# Patient Record
Sex: MALE | Race: BLACK OR AFRICAN AMERICAN | NOT HISPANIC OR LATINO | URBAN - METROPOLITAN AREA
[De-identification: names, ages, dates, MRNs, and addresses within clinical notes are randomized per-mention and may not be internally consistent; named-entity substitution may affect disease eponyms.]

---

## 2017-02-26 ENCOUNTER — TRANSFERRED RECORDS (OUTPATIENT)
Dept: HEALTH INFORMATION MANAGEMENT | Facility: CLINIC | Age: 42
End: 2017-02-26

## 2017-03-30 ENCOUNTER — BEH TREATMENT PLAN (OUTPATIENT)
Dept: BEHAVIORAL HEALTH | Facility: CLINIC | Age: 42
End: 2017-03-30

## 2017-03-30 ENCOUNTER — HOSPITAL ENCOUNTER (OUTPATIENT)
Dept: BEHAVIORAL HEALTH | Facility: CLINIC | Age: 42
Discharge: HOME | End: 2017-03-30
Attending: SOCIAL WORKER | Admitting: SOCIAL WORKER
Payer: COMMERCIAL

## 2017-03-30 VITALS
HEIGHT: 73 IN | SYSTOLIC BLOOD PRESSURE: 119 MMHG | WEIGHT: 220 LBS | HEART RATE: 106 BPM | DIASTOLIC BLOOD PRESSURE: 88 MMHG | BODY MASS INDEX: 29.16 KG/M2

## 2017-03-30 PROCEDURE — H0001 ALCOHOL AND/OR DRUG ASSESS: HCPCS

## 2017-03-30 ASSESSMENT — ANXIETY QUESTIONNAIRES
4. TROUBLE RELAXING: NOT AT ALL
GAD7 TOTAL SCORE: 2
3. WORRYING TOO MUCH ABOUT DIFFERENT THINGS: NOT AT ALL
6. BECOMING EASILY ANNOYED OR IRRITABLE: SEVERAL DAYS
1. FEELING NERVOUS, ANXIOUS, OR ON EDGE: SEVERAL DAYS
5. BEING SO RESTLESS THAT IT IS HARD TO SIT STILL: NOT AT ALL
2. NOT BEING ABLE TO STOP OR CONTROL WORRYING: NOT AT ALL
7. FEELING AFRAID AS IF SOMETHING AWFUL MIGHT HAPPEN: NOT AT ALL

## 2017-03-30 ASSESSMENT — PAIN SCALES - GENERAL: PAINLEVEL: NO PAIN (0)
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
## 2017-03-31 ASSESSMENT — PATIENT HEALTH QUESTIONNAIRE - PHQ9: SUM OF ALL RESPONSES TO PHQ QUESTIONS 1-9: 0

## 2017-03-31 ASSESSMENT — ANXIETY QUESTIONNAIRES: GAD7 TOTAL SCORE: 2
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
## 2017-04-03 ENCOUNTER — HOSPITAL ENCOUNTER (OUTPATIENT)
Dept: BEHAVIORAL HEALTH | Facility: CLINIC | Age: 42
End: 2017-04-03
Attending: SOCIAL WORKER
Payer: COMMERCIAL

## 2017-04-03 PROBLEM — F19.20 CHEMICAL DEPENDENCY (H): Status: ACTIVE | Noted: 2017-04-03

## 2017-04-03 PROCEDURE — H2035 A/D TX PROGRAM, PER HOUR: HCPCS | Mod: HQ
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
## 2017-04-04 NOTE — PROGRESS NOTES
Initial Services Plan        Before your first treatment group, please do the following    Immediate health & safety concerns:  Go to the emergency room if you start to have withdrawal symptoms.  Look for sober housing and a supportive social network.  Obtain personal items (glasses, hearing aides, medicines, diabetes supplies, clothing, etc.).  Look for a support network (such as AA, NA, DBT group, a Spiritism group, etc.)    Suggestions for client during the time between intake & completion of treatment plan:  Tour your treatment center (unit or outpatient clinic).  Introduce yourself to the treatment group.  Spend time getting to know your peers.  Complete your psycho-social paperwork.  Complete the problem list for your treatment plan.  Review your patient or client handbook.    Client issues to be addressed in the first treatment sessions:  Talk to your family and friends about the family program.  Arrange  and transportation as needed.  Contact your P.O. (/) if needed.   Explore treatment rationale at first group session.      CAROLE Carpenter  4/3/2017  8:10 PM

## 2017-04-04 NOTE — PROGRESS NOTES
Orientation to [facility] Program  Date: 4/3/2017  Time: 7:30pm Duration: 1 hour  D- Client attended orientation. He was provided with the orientation packet which included: Program philosophy, treatment goals, program schedules, education components, using treatment materials, program rules, client rights/responsibilities, information on HIV, STDS, Hepatitis, TB, information on use while pregnant, Nalaxone (Narcan) and opioid education information, program abuse prevention plan/reporting protocol, client grievance procedure, risks of treatment, confidentiality, treatment agreement, facility tour, safety information and information on co-occurring disorders. Client was also given the business card of his counselor and was told to contact him if he has any questions. Client was introduced to the Stages of Change Model and Post-Acute Withdrawal.   I- Signing of paperwork, tour of facility and treatment planning session. Goals developed this session included finding a sober support network. Client was provided with psychoeducation about Stages of Change, Post-Acute Withdrawal and was asked about where he feels he is in the stages of change. Client was informed about what each stage of change means.  A- Client was engaged and alert. He appears to be in the preparation stage.   P-  Client will review orientation paperwork and materials.   CAROLE Rivera

## 2017-04-10 ENCOUNTER — RECORDS - HEALTHEAST (OUTPATIENT)
Dept: LAB | Facility: CLINIC | Age: 42
End: 2017-04-10

## 2017-04-10 LAB
CHOLEST SERPL-MCNC: 159 MG/DL
FASTING STATUS PATIENT QL REPORTED: ABNORMAL
HDLC SERPL-MCNC: 33 MG/DL
LDLC SERPL CALC-MCNC: 80 MG/DL
TRIGL SERPL-MCNC: 228 MG/DL

## 2017-04-11 ENCOUNTER — HOSPITAL ENCOUNTER (OUTPATIENT)
Dept: BEHAVIORAL HEALTH | Facility: CLINIC | Age: 42
End: 2017-04-11
Attending: SOCIAL WORKER
Payer: COMMERCIAL

## 2017-04-11 PROCEDURE — H2035 A/D TX PROGRAM, PER HOUR: HCPCS | Mod: HQ

## 2017-04-12 NOTE — PROGRESS NOTES
[facility]  Adult Chemical Dependency Program  Treatment Plan Requirements      Adult CD Treatment Plan                                Maurice Sandifer   [id number]   1975 42 year old male      These services are provided by the facility for each patient/client according to the individual's treatment plan:    Individual and group counseling    Education    Transition services    Services to address any co-occurring mental illness    Service coordination    Initial Treatment Plan Goals:  1. Complete all the requirements of Program Orientation.  2. Maintain medication compliance throughout the program.  3. Complete requirements for workshop/skills groups based on identified issues on your problem list.  4. Complete the support group attendance feedback sheet weekly.  5. Gain family involvement in treatment process to address family issues from the problem list.  6. Attend and participate in all required groups per individual treatment plan.  7. Focus attention to individualized issues from the treatment plan.  8. Complete all requirements for UA's, alcohol screening tests and other testing.  9. Schedule a physical examination if recommended.    In addition to the above, complete all individual goals as specifically outlines on your treatment plan.    Criteria for discharge:  Patients/clients are discharged from the program following completion of the entire program including Phase I and II or acceptance of other post-treatment referrals such as [facility] house, or aftercare at other facilities.  Patients/clients may also be discharged for inappropriate behavior or chemical use.      Favorable Discharge - Patients/clients have completed agreed upon treatment goals, understand their diagnosis and appear motivated about the follow-up care.    Guarded Discharge - Patients/clients have demonstrated some understanding of their diagnosis and recovery process, and have completed some of their  treatment goals.  This prognosis also includes patients/clients who have completed some treatment goals but have not made commitment to community support or follow through with referrals.    Unfavorable Discharge - Patients/clients have not completed agreed upon treatment goals due to their own choice, have limited understanding of their diagnosis, and have shown minimal or inconsistent behavior conducive to recovery.  Those patients/clients discharged due to behavioral problems will also be unfavorable discharges.      Acute Intoxication/Withdrawal Potential     DIMENSION 1  RISK FACTOR: 0     SUBSTANCE USE DISORDERS:  Alcohol, Cannabis and Cocaine            Date Assigned Source Area of Treatment Focus / Goal / Treatment Strategies    Target  Date Initials Outcome Date Completed   4/11/17 Self -  Current  Area of Treatment Focus:   Substance use, cravings and urges.  Last use date was reported as 2/25/17.       Goal:   Develop effective strategies to maintain sobriety.      Treatment Strategies:   Report to counselor and group any alcohol or drug use.   To end of tx wsm Client did not return to treatment 4/19/17       Biomedical Conditions and Complaints     DIMENSION 2  RISK FACTOR: 0             Date Assigned Source Area of Treatment Focus / Goal / Treatment Strategies Target  Date Initials Outcome Date Completed   4/11/17 Self -  Current  Area of Treatment Focus:  Client reported no physical biomedical conditions of note.    Goal:   None    Treatment Strategies:   None   na wsm Client did not return to treatment 4/19/17       Emotional/Behavioral/Cognitive Conditions and Complications     DIMENSION 3  RISK FACTOR: 2             Date Assigned Source Area of Treatment Focus / Goal / Treatment Strategies Target  Date Initials Outcome Date Completed     4/11/17 Self -  Current  Area of Treatment Focus:   Client did not return to treatment to develop Tx plan    Goal:   n/a    Treatment Strategies:    n/a   n/a          Readiness to Change     DIMENSION 4  RISK FACTOR: 1             Date Assigned Source Area of Treatment Focus / Goal / Treatment Strategies Target  Date Initials Outcome Date Completed      Area of Treatment Focus:  {Client did not return to treatment to develop Tx plan    Goal:   n/a    Treatment Strategies:   n/a   n/a         Relapse/Continues Use/Continues Problem Potential     DIMENSION 5  RISK FACTOR: 3                 Date Assigned Source Area of Treatment Focus / Goal / Treatment Strategies Target  Date Initials Outcome Date Completed      Area of Treatment Focus:   Client did not return to treatment to develop Tx plan    Goal:   n/a    Treatment Strategies:   n/a   n/a        Recovery Environment     DIMENSION 6  RISK FACTOR: 2                 Date Assigned Source Area of Treatment Focus / Goal / Treatment Strategies Target  Date Initials Outcome Date Completed      Area of Treatment Focus:   Client did not return to treatment to develop Tx plan    Goal:   n/a    Treatment Strategies:   n/a   n/a      Individual abuse prevention plan (required for lodging plus) : specific actions, referral:   No additional protection measures required other than the Program Abuse Prevention Plan - No
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
## 2017-04-12 NOTE — PROGRESS NOTES
Acknowledgement of Current Treatment Plan       I have reviewed my treatment plan with my counselor. I agree with the plan as it is written in the electronic health record.         Last reported use: 2/25/17    Name Signature Date   Maurice Sandifer     Name of Therapist / Counselor     Ramsey Pearson, CAROLE, LMFT

## 2017-04-19 NOTE — PROGRESS NOTES
"CHEMICAL DEPENDENCY DISCHARGE SUMMARY      EVALUATION COUNSELOR:  CAROLE Muller.   TREATMENT COUNSELOR:  CAROLE Romo, [facility].   REFERRAL SOURCE:  Self.   PROGRAM:  [facility] Adult Intensive Outpatient CD Program at [facility].   ADMISSION DATE:  04/03/2017.   LAST SESSION DATE:  04/03/2017.   DISCHARGE DATE:  04/19/2017.  Maurice Sandifer did not attend group session after attending orientation.  Client was contacted by counselor and left a voicemail indicating that if he did not respond he would be discharged.  Client did not respond.   ADMISSION DIAGNOSES:   1.  Alcohol use disorder, severe, F10.20.   2.  Cannabis use disorder, severe, F12.20.   3.  Cocaine use disorder, disorder, severe, F14.20.   4.  Opioid use disorder, moderate, F11.20.   DISCHARGE DIAGNOSES:     1.  Alcohol use disorder, severe, F10.20.   2.  Cannabis use disorder, severe, F12.20.   3.  Cocaine use disorder, disorder, severe, F14.20.   4.  Opioid use disorder, moderate, F11.20.   DISCHARGE STATUS:  Against staff approval.   LAST USE DATE:  Last known use date reported by the client was 02/25/2017.   HOURS OF TREATMENT COMPLETED:  The client completed 1 hour of group session.      PRESENTING INFORMATION:  Client presented for a CD evaluation as he reported he wanted ongoing care because \"I don't want to die.\"  He was at [facility] in [place] from 02/26/2017 until 03/27/2017.      SERVICES PROVIDED:  Chemical dependency assessment, orientation to the program.      ISSUES ADDRESSED IN TREATMENT:   DIMENSION 1:  Acute Intoxication Withdrawal Potential:  Admission risk rating 0.  Discharge risk cannot be assessed.  Client only attended 1 hour of treatment.      DIMENSION 2:  Initial risk rating 0, current risk rating cannot be assessed.      DIMENSION 3:  Initial risk rating 2, current risk rating cannot be assessed.      DIMENSION 4:  Admission risk rating 1.  Discharge risk rating cannot be assessed.    "   DIMENSION 5:  Admission risk rating 3.  Discharge risk rating cannot be assessed.      DIMENSION 6:  Admission risk rating 2.  Discharge risk rating cannot be assessed.  Client only attended 1 hour of treatment.  Therefore, treatment plan was not completed and no progress was made.      STRENGTHS:  Cannot be assessed at this time as client only attended 1 hour of treatment and was not assessed by this writer.      PROGNOSIS:  Unfavorable.      LAST KNOWN LIVING ARRANGEMENTS:  Client was living with his wife and her 4 children.      CONTINUING CARE RECOMMENDATIONS:  Client is recommended to follow through with the original recommendations of his CD assessment which were to complete co-occurring intensive outpatient treatment, abstinence, attending support group meetings and begin mental health therapy.         This information has been disclosed to you from records protected by Federal confidentiality rules (42 CFR part 2). The Federal rules prohibit you from making any further disclosure of this information unless further disclosure is expressly permitted by the written consent of the person to whom it pertains or as otherwise permitted by 42 CFR part 2. A general authorization for the release of medical or other information is NOT sufficient for this purpose. The Federal rules restrict any use of the information to criminally investigate or prosecute any alcohol or drug abuse patient.      JOCELYN KEHR SPARBY, [facility]             D: 2017 08:56   T: 2017 10:37   MT: SK      Name:     SANDIFER, MAURICE   MRN:      [id number]        Account:      [id number]   :      1975           Visit Date:   2017      Document: [id number]

## 2017-06-29 ENCOUNTER — OFFICE VISIT - HEALTHEAST (OUTPATIENT)
Dept: SLEEP MEDICINE | Facility: CLINIC | Age: 42
End: 2017-06-29

## 2017-06-29 DIAGNOSIS — G47.10 HYPERSOMNIA, UNSPECIFIED: ICD-10-CM

## 2017-06-29 DIAGNOSIS — R29.818 SUSPECTED SLEEP APNEA: ICD-10-CM

## 2017-06-29 DIAGNOSIS — G47.8 SLEEP DYSFUNCTION WITH SLEEP STAGE DISTURBANCE: ICD-10-CM

## 2017-06-29 DIAGNOSIS — R06.83 SNORING: ICD-10-CM

## 2017-06-29 ASSESSMENT — MIFFLIN-ST. JEOR: SCORE: 1994.84

## 2017-07-26 ENCOUNTER — RECORDS - HEALTHEAST (OUTPATIENT)
Dept: SLEEP MEDICINE | Facility: CLINIC | Age: 42
End: 2017-07-26

## 2017-07-26 DIAGNOSIS — G47.30 SLEEP APNEA, UNSPECIFIED: ICD-10-CM

## 2017-07-26 DIAGNOSIS — R06.83 SNORING: ICD-10-CM

## 2017-07-26 DIAGNOSIS — G47.10 HYPERSOMNIA, UNSPECIFIED: ICD-10-CM

## 2017-07-26 DIAGNOSIS — G47.8 OTHER SLEEP DISORDERS: ICD-10-CM

## 2017-08-07 ENCOUNTER — COMMUNICATION - HEALTHEAST (OUTPATIENT)
Dept: SLEEP MEDICINE | Facility: CLINIC | Age: 42
End: 2017-08-07

## 2017-08-17 ENCOUNTER — OFFICE VISIT - HEALTHEAST (OUTPATIENT)
Dept: SLEEP MEDICINE | Facility: CLINIC | Age: 42
End: 2017-08-17

## 2017-08-17 ENCOUNTER — AMBULATORY - HEALTHEAST (OUTPATIENT)
Dept: SLEEP MEDICINE | Facility: CLINIC | Age: 42
End: 2017-08-17

## 2017-08-17 DIAGNOSIS — G47.8 SLEEP DYSFUNCTION WITH SLEEP STAGE DISTURBANCE: ICD-10-CM

## 2017-08-17 DIAGNOSIS — G47.10 HYPERSOMNIA, UNSPECIFIED: ICD-10-CM

## 2017-08-17 DIAGNOSIS — G47.33 OSA (OBSTRUCTIVE SLEEP APNEA): ICD-10-CM

## 2017-08-17 ASSESSMENT — MIFFLIN-ST. JEOR: SCORE: 2013.44

## 2017-08-22 ENCOUNTER — AMBULATORY - HEALTHEAST (OUTPATIENT)
Dept: SLEEP MEDICINE | Facility: CLINIC | Age: 42
End: 2017-08-22

## 2021-05-30 ENCOUNTER — RECORDS - HEALTHEAST (OUTPATIENT)
Dept: ADMINISTRATIVE | Facility: CLINIC | Age: 46
End: 2021-05-30

## 2021-05-31 VITALS — HEIGHT: 73 IN | WEIGHT: 238 LBS | BODY MASS INDEX: 31.54 KG/M2

## 2021-05-31 VITALS — WEIGHT: 233.9 LBS | HEIGHT: 73 IN | BODY MASS INDEX: 31 KG/M2

## 2021-06-11 NOTE — PROGRESS NOTES
Dear Dr. Neeru Newman, [facility]  [address] [place] [address]    Thank you for the opportunity to participate in the care of . Maurice J Sandifer.    He is a 42 y.o. male who comes to the clinic with a chief complaint of excessive daytime sleepiness that has been going on for approximately 4-5 years.  The patient has been told by his wife that he has significant pauses in his breathing during sleep followed by loud snoring.  He also continues to feel tired despite adequate hours of sleep.  He also complains of frequent episodes of nocturia.  His review of systems significant for visual changes which has been addressed with ophthalmology.     Ideal Sleep-Wake Cycle(devoid of societal pressure):    Patient would try to initiate sleep at around 1 AM with a sleep latency of 2-3 hours. The patient would have 4 nocturnal awakening. Final wake up time is around 10:30 AM    Past Medical History  Past Medical History:   Diagnosis Date     Stomach ulcer         Past Surgical History  No past surgical history on file.     Meds  Current Outpatient Prescriptions   Medication Sig Dispense Refill     omeprazole (PRILOSEC) 20 MG capsule Take 1 capsule (20 mg total) by mouth daily. 30 capsule 0     tamsulosin (FLOMAX) 0.4 mg Cp24 Take 0.4 mg by mouth daily.       No current facility-administered medications for this visit.         Allergies  Review of patient's allergies indicates no known allergies.     Social History  Social History     Social History     Marital status: Single     Spouse name: N/A     Number of children: N/A     Years of education: N/A     Occupational History     Not on file.     Social History Main Topics     Smoking status: Current Every Day Smoker     Packs/day: 0.50     Types: Cigarettes     Smokeless tobacco: Not on file     Alcohol use Yes     Drug use: Not on file     Sexual activity: Not on file     Other Topics Concern     Not on file     Social History Narrative        Family History  No family  history on file.     Review of Systems:  Constitutional: Negative except as noted in HPI.   Eyes: Negative except as noted in HPI.   ENT: Negative except as noted in HPI.   Cardiovascular: Negative except as noted in HPI.   Respiratory: Negative except as noted in HPI.   Gastrointestinal: Negative except as noted in HPI.   Genitourinary: Negative except as noted in HPI.   Musculoskeletal: Negative except as noted in HPI.   Integumentary: Negative except as noted in HPI.   Neurological: Negative except as noted in HPI.   Psychiatric: Negative except as noted in HPI.   Endocrine: Negative except as noted in HPI.   Hematologic/Lymphatic: Negative except as noted in HPI.      STOP BANG 6/29/2017   Do you snore loudly (louder than talking or loud enough to be heard through closed doors)? 1   Do you often feel tired, fatigued, or sleepy during daytime? 1   Has anyone observed you stop breathing in your sleep? 1   Do you have or are you being treated for high blood pressure? 0   BMI more than 35 kg/m2 0   Age over 50 years old? 0   Neck circumference greater than 16 inches? 1   Gender male? 1   Total Score 5   Epworths Sleepiness Scale 6/29/2017   Sitting and reading 0   Watching TV 3   Sitting, inactive in a public place (e.g. a theatre or a meeting) 0   As a passenger in a car for an hour without a break 2   Lying down to rest in the afternoon when circumstances permit 3   Sitting and talking to someone 0   Sitting quietly after a lunch without alcohol 0   In a car, while stopped for a few minutes in traffic 0   Total score 8   Rooming 6/29/2017   Usual bedtime 1   Sleep Latency 2-3 hrs   Awakenings 4   Wake Up Time 1030   Energy Drinks y   Coffee y   Cola 0   Difficulty falling asleep Yes   Difficulty staying asleep Yes   Excessive daytime tiredness Yes   Excessive daytime sleepiness Yes   Dozing off while driving Yes   Shift Worker No   Sleep Walking? Yes   Sleep Talking? Yes   Kicking or punching? Yes   Restless legs  "symptoms No       Physical Exam:  /72  Pulse 98  Ht 6' 1\" (1.854 m)  Wt (!) 233 lb 14.4 oz (106.1 kg)  SpO2 98%  BMI 30.86 kg/m2  BMI:Body mass index is 30.86 kg/(m^2).   GEN: NAD, obese  Head: Normocephalic.  EYES: PERRLA, EOMI  ENT: Oropharynx is clear, mallampatti class 4+ airway.   Nasal mucosa is moist without erythema  Neck : Thyroid is within normal limits. Neck circ 17 inches.  CV: Regular rate and rhythm, S1 & S2 positive.  LUNGS: Bilateral breathsounds heard.   ABDOMEN: Positive bowel sounds in all quadrants, soft, no rebound or guarding  MUSCULOSKELETAL: Bilateral trace leg swelling  SKIN: warm, dry, no rashes  Neurological: Alert, oriented to time, place, and person.  Psych: normal mood, normal affect     Labs/Studies:     Lab Results   Component Value Date    WBC 9.2 07/15/2016    HGB 13.4 (L) 07/15/2016    HCT 41.2 07/15/2016    MCV 89 07/15/2016     07/15/2016         Chemistry        Component Value Date/Time     07/15/2016 1902    K 3.9 07/15/2016 1902     07/15/2016 1902    CO2 20 (L) 07/15/2016 1902    BUN 13 07/15/2016 1902    CREATININE 1.06 07/15/2016 1902     07/15/2016 1902        Component Value Date/Time    CALCIUM 9.2 07/15/2016 1902    ALKPHOS 66 07/15/2016 1902    AST 20 07/15/2016 1902    ALT 21 07/15/2016 1902    BILITOT 0.5 07/15/2016 1902            No results found for: FERRITIN  No results found for: TSH      Assessment and Plan:  In summary Maurice J Sandifer is a 42 y.o. year old male here for sleep disturbance.  1.  Suspected sleep apnea   Mr. Maurice J Sandifer has high risk for obstructive sleep apnea based on the history of witnessed apnea, snoring and a crowded airway. I educated the patient on the underlying pathophysiology of obstructive sleep apnea. We reviewed the risks associated with sleep apnea, including increased cardiovascular risk and overall death. We talked about treatments briefly. I recommend getting an split-night " nocturnal polysomnography. The patient should return to the clinic to discuss results and treatment option in a patient-centered approach.  2.  Hypersomnia  3.  Snoring  4.  Other sleep disturbance    History of cranial facial surgery?  No    Patient verbalized understanding of these issues, agrees with the plan and all questions were answered today. Patient was given an opportuntity to voice any other symptoms or concerns not listed above. Patient did not have any other symptoms or concerns.      Patient told to return in one week after the sleep study is interpreted. Patient instructed to stop at  to schedule appointment before leaving today.    Josue Castillo DO  Board Certified in Internal Medicine and Sleep Medicine  [facility].    (Note created with Dragon voice recognition and unintended spelling errors and word substitutions may occur)

## 2021-06-12 NOTE — PROGRESS NOTES
Order for Durable Medical Equipment was processed and equipment ordered.   DME provider: [facility]  Date Faxed: 08/17/2017  Ordering Provider: Dr. Castillo  Equipment ordered: Cpap

## 2021-06-12 NOTE — PROGRESS NOTES
Patient was offered choice of vendor and chose [facility].  Patient Maurice J Sandifer was set up at [facility] on 8/22/17. Patient received a Resmed [id number] Auto. Pressures were set at 7-12.   Patient s ramp is 5 cm H2O for Auto and FLEX/EPR is EPR.  Patient received a [employer]onics Mask name: Dreamwear Gel  Pillow Size Large, Heated tubing and heated humidifier.    Millere Her

## 2021-06-12 NOTE — PROGRESS NOTES
"Dear Dr. Neeru Newman, PA-C  [address] [place] [address],    Thank you for the opportunity to participate in the care of Maurice J Sandifer.     He is a 42 y.o.  male patient who comes to the sleep medicine clinic for review of his sleep study. The study was completed on 07/26/17 which showed the the patient had moderate obstructive sleep apnea with an apnea hypopnea index of 16 events per hour with the lowest O2 sat of 84%.      Past Medical History:   Diagnosis Date     Stomach ulcer        No past surgical history on file.    Social History     Social History     Marital status: Single     Spouse name: N/A     Number of children: N/A     Years of education: N/A     Occupational History     Not on file.     Social History Main Topics     Smoking status: Current Every Day Smoker     Packs/day: 0.50     Types: Cigarettes     Smokeless tobacco: Not on file     Alcohol use Yes     Drug use: Not on file     Sexual activity: Not on file     Other Topics Concern     Not on file     Social History Narrative         Current Outpatient Prescriptions   Medication Sig Dispense Refill     omeprazole (PRILOSEC) 20 MG capsule Take 1 capsule (20 mg total) by mouth daily. 30 capsule 0     tamsulosin (FLOMAX) 0.4 mg Cp24 Take 0.4 mg by mouth daily.       No current facility-administered medications for this visit.        No Known Allergies    Physical Exam:  Pulse 90  Ht 6' 1\" (1.854 m)  Wt (!) 238 lb (108 kg)  SpO2 98%  BMI 31.4 kg/m2  BMI:Body mass index is 31.4 kg/(m^2).   GEN: NAD, obese  Psych: normal mood, normal affect     Labs/Studies:  - We reviewed the results of the overnight PSG as described on the HPI.     Lab Results   Component Value Date    WBC 9.2 07/15/2016    HGB 13.4 (L) 07/15/2016    HCT 41.2 07/15/2016    MCV 89 07/15/2016     07/15/2016         Chemistry        Component Value Date/Time     07/15/2016 1902    K 3.9 07/15/2016 1902     07/15/2016 1902    CO2 20 (L) 07/15/2016 " 1902    BUN 13 07/15/2016 1902    CREATININE 1.06 07/15/2016 1902     07/15/2016 1902        Component Value Date/Time    CALCIUM 9.2 07/15/2016 1902    ALKPHOS 66 07/15/2016 1902    AST 20 07/15/2016 1902    ALT 21 07/15/2016 1902    BILITOT 0.5 07/15/2016 1902            No results found for: FERRITIN        Assessment and Plan:  In summary Maurice J Sandifer is a 42 y.o. year old male here for review of his sleep study.  1. Obstructive Sleep Apnea  We had an extensive conversation to review the results of his sleep study and to  him on the importance of treating sleep apnea. We discussed treatment options including oral appliance versus CPAP. Patient decided to proceed with CPAP. He will start using the device as soon as he receives it with the intention to use if for the entire night. We discussed some tips to increase PAP tolerance as well as the normal curve of adaptation. CPAP is going to provide improved respiratory function during the night but it can cause some sleep disruption that tends to improve with continuous usage. He should return to the clinic in 6 weeks to review compliance and efficacy monitoring.  2.  Hypersomnia  3.  Other sleep disturbance     Patient verbalized understanding of these issues, agrees with the plan and all questions were answered today. Patient was given an opportuntity to voice any other symptoms or concerns not listed above. Patient did not have any other symptoms or concerns.      Patient told to return in 6 weeks. Patient instructed to stop at  to schedule appointment before leaving today.    Josue Castillo DO  Board Certified in Internal Medicine and Sleep Medicine  [facility].    We spent a total of 10 minutes of face-to-face encounter and more than 50% of the encounter was used for counseling or coordination of care.    (Note created with Dragon voice recognition and unintended spelling errors and word substitutions may occur)

## 2023-01-01 ENCOUNTER — NURSE TRIAGE (OUTPATIENT)
Dept: NURSING | Facility: CLINIC | Age: 48
End: 2023-01-01
Payer: COMMERCIAL

## 2023-01-01 ENCOUNTER — HEALTH MAINTENANCE LETTER (OUTPATIENT)
Age: 48
End: 2023-01-01

## 2023-03-02 NOTE — TELEPHONE ENCOUNTER
"Pt calls to report sxs x 48 hours:  - sore throat  - stomach issues (diarrhea and nausea)  - headache  - lightheaded  - fatigued  - chills  - nasal congestion  - cough    Today -> \"Went to a [facility] center for covid test -> negative.\"  However, pt states \"I didn't blow my nose; the nasal swab was full of phlegm.\"  Therefore suspicion of possible false negative covid result.    Diarrhea has occurred approx every six hours over the past two days.  Not severe.  Discussed electrolyte-replacement fluids and 'brat' diet.  Mucinex and Afril nasal spray for phlegm/cough.    Due to uncertainty of pt's status in terms of possible influenza/strep/covid, initial decision is for virtual provider visit.  Pt however states preference for urgent care visit (in-person).  States intention to go to [facility] location.    Rekha [facility] Nurse Advisor     Reason for Disposition    Chest pain or pressure  (Exception: MILD central chest pain, present only when coughing)    Additional Information    Negative: SEVERE difficulty breathing (e.g., struggling for each breath, speaks in single words)    Negative: Difficult to awaken or acting confused (e.g., disoriented, slurred speech)    Negative: Bluish (or gray) lips or face now    Negative: Shock suspected (e.g., cold/pale/clammy skin, too weak to stand, low BP, rapid pulse)    Negative: Sounds like a life-threatening emergency to the triager    Negative: [1] Diagnosed or suspected COVID-19 AND [2] symptoms lasting 3 or more weeks    Negative: [1] COVID-19 exposure AND [2] no symptoms    Negative: COVID-19 vaccine reaction suspected (e.g., fever, headache, muscle aches) occurring 1 to 3 days after getting vaccine    Negative: COVID-19 vaccine, questions about    Negative: [1] Lives with someone known to have influenza (flu test positive) AND [2] flu-like symptoms (e.g., cough, runny nose, sore throat, SOB; with or without fever)    Negative: [1] Adult with possible COVID-19 " symptoms AND [2] triager concerned about severity of symptoms or other causes    Negative: COVID-19 and breastfeeding, questions about    Negative: SEVERE or constant chest pain or pressure  (Exception: Mild central chest pain, present only when coughing.)    Negative: MODERATE difficulty breathing (e.g., speaks in phrases, SOB even at rest, pulse 100-120)    Negative: Headache and stiff neck (can't touch chin to chest)    Negative: Oxygen level (e.g., pulse oximetry) 90 percent or lower    Protocols used: CORONAVIRUS (COVID-19) DIAGNOSED OR [id number]